# Patient Record
Sex: FEMALE | HISPANIC OR LATINO | URBAN - METROPOLITAN AREA
[De-identification: names, ages, dates, MRNs, and addresses within clinical notes are randomized per-mention and may not be internally consistent; named-entity substitution may affect disease eponyms.]

---

## 2018-12-13 ENCOUNTER — OFFICE VISIT (OUTPATIENT)
Dept: URBAN - METROPOLITAN AREA CLINIC 88 | Facility: CLINIC | Age: 53
End: 2018-12-13
Payer: COMMERCIAL

## 2018-12-13 DIAGNOSIS — R51 HEADACHE: Primary | ICD-10-CM

## 2018-12-13 DIAGNOSIS — D23.39 OTHER BENIGN NEOPLASM OF SKIN OF OTHER PART OF FACE: ICD-10-CM

## 2018-12-13 PROCEDURE — 99214 OFFICE O/P EST MOD 30 MIN: CPT | Performed by: OPTOMETRIST

## 2018-12-13 PROCEDURE — 92083 EXTENDED VISUAL FIELD XM: CPT | Performed by: OPTOMETRIST

## 2018-12-13 ASSESSMENT — INTRAOCULAR PRESSURE
OD: 18
OS: 18

## 2020-12-29 NOTE — IMPRESSION/PLAN
Impression: Headache: R51. Plan: Reviewed headache concerns with patient in detail. No ocular or visual abnormalities found during examination attributed to headache origin. Baseline VF 30-2, no neurological pattern loss OU. Recommended follow up with Neurology for continued care and imaging studies.
Impression: Other benign neoplasm of skin of other part of face: D23.39. Plan: Elevated lesion right lid, longstanding. Monitor. Patient to RTC if any changes observed.
Detail Level: Zone
Patient Specific Counseling (Will Not Stick From Patient To Patient): I recommended she use cover up on this area, it is too subtle to treat with laser
Detail Level: Detailed

## 2023-04-10 ENCOUNTER — OFFICE VISIT (OUTPATIENT)
Dept: URBAN - METROPOLITAN AREA CLINIC 88 | Facility: CLINIC | Age: 58
End: 2023-04-10
Payer: COMMERCIAL

## 2023-04-10 DIAGNOSIS — E11.9 TYPE 2 DIABETES MELLITUS WITHOUT COMPLICATIONS: ICD-10-CM

## 2023-04-10 DIAGNOSIS — H52.222 REGULAR ASTIGMATISM, LEFT EYE: ICD-10-CM

## 2023-04-10 DIAGNOSIS — H11.153 PINGUECULA, BILATERAL: Primary | ICD-10-CM

## 2023-04-10 PROCEDURE — 99203 OFFICE O/P NEW LOW 30 MIN: CPT | Performed by: OPHTHALMOLOGY

## 2023-04-10 ASSESSMENT — KERATOMETRY
OS: 43.63
OD: 43.25

## 2023-04-10 ASSESSMENT — INTRAOCULAR PRESSURE
OS: 18
OD: 16

## 2023-04-10 NOTE — IMPRESSION/PLAN
Impression: Type 2 diabetes mellitus without complications: L99.8. Plan: No diabetic retinopathy is noted. Patient is advised that a yearly ophthalmic exam is recommended. Return sooner if any new symptoms.

## 2023-04-10 NOTE — IMPRESSION/PLAN
Impression: Regular astigmatism, left eye: H52.222. Plan: Recommend pt RTC PRN with optometrist for refraction for glasses. +1.75-+2.00 OTC readers recommended if patient does not want to come back for a refraction.

## 2023-04-10 NOTE — IMPRESSION/PLAN
Impression: Pinguecula, bilateral: H11.153. Plan: Discussed status with patient. Recommend UV protection when outdoors and artificial tears for comfort.